# Patient Record
Sex: FEMALE | Race: WHITE | Employment: UNEMPLOYED | URBAN - METROPOLITAN AREA
[De-identification: names, ages, dates, MRNs, and addresses within clinical notes are randomized per-mention and may not be internally consistent; named-entity substitution may affect disease eponyms.]

---

## 2024-05-02 ENCOUNTER — OFFICE VISIT (OUTPATIENT)
Dept: INTERNAL MEDICINE CLINIC | Facility: CLINIC | Age: 64
End: 2024-05-02
Payer: MEDICARE

## 2024-05-02 VITALS
SYSTOLIC BLOOD PRESSURE: 130 MMHG | BODY MASS INDEX: 34.91 KG/M2 | HEART RATE: 60 BPM | WEIGHT: 217.19 LBS | DIASTOLIC BLOOD PRESSURE: 74 MMHG | HEIGHT: 66 IN | OXYGEN SATURATION: 100 % | TEMPERATURE: 98 F

## 2024-05-02 DIAGNOSIS — Z12.11 SCREENING FOR COLON CANCER: ICD-10-CM

## 2024-05-02 DIAGNOSIS — I10 PRIMARY HYPERTENSION: Primary | ICD-10-CM

## 2024-05-02 DIAGNOSIS — Z00.00 ANNUAL PHYSICAL EXAM: ICD-10-CM

## 2024-05-02 DIAGNOSIS — Z78.0 MENOPAUSE: ICD-10-CM

## 2024-05-02 DIAGNOSIS — M54.40 ACUTE RIGHT-SIDED LOW BACK PAIN WITH SCIATICA, SCIATICA LATERALITY UNSPECIFIED: ICD-10-CM

## 2024-05-02 DIAGNOSIS — I89.0 LYMPHEDEMA: ICD-10-CM

## 2024-05-02 PROBLEM — G47.33 OSA (OBSTRUCTIVE SLEEP APNEA): Status: ACTIVE | Noted: 2024-05-02

## 2024-05-02 PROBLEM — K21.9 GASTROESOPHAGEAL REFLUX DISEASE WITHOUT ESOPHAGITIS: Status: ACTIVE | Noted: 2024-05-02

## 2024-05-02 PROBLEM — E66.01 SEVERE OBESITY (BMI 35.0-39.9) WITH COMORBIDITY (HCC): Chronic | Status: ACTIVE | Noted: 2024-05-02

## 2024-05-02 PROCEDURE — 3078F DIAST BP <80 MM HG: CPT | Performed by: INTERNAL MEDICINE

## 2024-05-02 PROCEDURE — 1126F AMNT PAIN NOTED NONE PRSNT: CPT | Performed by: INTERNAL MEDICINE

## 2024-05-02 PROCEDURE — 3075F SYST BP GE 130 - 139MM HG: CPT | Performed by: INTERNAL MEDICINE

## 2024-05-02 PROCEDURE — 1160F RVW MEDS BY RX/DR IN RCRD: CPT | Performed by: INTERNAL MEDICINE

## 2024-05-02 PROCEDURE — 1159F MED LIST DOCD IN RCRD: CPT | Performed by: INTERNAL MEDICINE

## 2024-05-02 PROCEDURE — 3008F BODY MASS INDEX DOCD: CPT | Performed by: INTERNAL MEDICINE

## 2024-05-02 PROCEDURE — 99204 OFFICE O/P NEW MOD 45 MIN: CPT | Performed by: INTERNAL MEDICINE

## 2024-05-02 RX ORDER — ATORVASTATIN CALCIUM 10 MG/1
10 TABLET, FILM COATED ORAL DAILY
COMMUNITY
Start: 2022-01-01 | End: 2024-05-02

## 2024-05-02 RX ORDER — VALSARTAN 80 MG/1
80 TABLET ORAL DAILY
COMMUNITY
End: 2024-05-02

## 2024-05-02 RX ORDER — DICLOFENAC SODIUM 75 MG/1
75 TABLET, DELAYED RELEASE ORAL 2 TIMES DAILY
Qty: 180 TABLET | Refills: 0 | Status: SHIPPED | OUTPATIENT
Start: 2024-05-02

## 2024-05-02 RX ORDER — ATORVASTATIN CALCIUM 10 MG/1
10 TABLET, FILM COATED ORAL DAILY
Qty: 90 TABLET | Refills: 0 | Status: SHIPPED | OUTPATIENT
Start: 2024-05-02

## 2024-05-02 RX ORDER — RABEPRAZOLE SODIUM 20 MG/1
20 TABLET, DELAYED RELEASE ORAL DAILY
Qty: 90 TABLET | Refills: 0 | Status: SHIPPED | OUTPATIENT
Start: 2024-05-02

## 2024-05-02 RX ORDER — LISINOPRIL 40 MG/1
40 TABLET ORAL DAILY
Qty: 90 TABLET | Refills: 0 | Status: SHIPPED | OUTPATIENT
Start: 2024-05-02

## 2024-05-02 RX ORDER — DICLOFENAC SODIUM 75 MG/1
75 TABLET, DELAYED RELEASE ORAL 2 TIMES DAILY
COMMUNITY
Start: 2014-01-01 | End: 2024-05-02

## 2024-05-02 RX ORDER — LISINOPRIL 20 MG/1
1 TABLET ORAL DAILY
COMMUNITY
End: 2024-05-02

## 2024-05-02 RX ORDER — RABEPRAZOLE SODIUM 20 MG/1
20 TABLET, DELAYED RELEASE ORAL DAILY
COMMUNITY
Start: 2020-01-01 | End: 2024-05-02

## 2024-05-02 RX ORDER — VALSARTAN 40 MG/1
40 TABLET ORAL
COMMUNITY
Start: 2018-02-26 | End: 2024-05-02

## 2024-05-02 RX ORDER — PANTOPRAZOLE SODIUM 40 MG/1
40 TABLET, DELAYED RELEASE ORAL
COMMUNITY
Start: 2018-01-29 | End: 2024-05-02

## 2024-05-02 NOTE — PROGRESS NOTES
Subjective:     Patient ID: Daxa Leyva is a 64 year old female.    HPI    History/Other:   She came in today to establish care with new physician    She has history of chronic lymphedema, high blood pressure, hypercholesterolemia and GERD.  She denies any complaints.  She needs refill on her medication.  She was supposed to do colonoscopy last year she did Cologuard instead.  She is due for mammogram.  She is due for blood work.    Review of Systems   Constitutional: Negative.    HENT: Negative.     Eyes: Negative.    Respiratory: Negative.     Cardiovascular: Negative.    Gastrointestinal: Negative.    Endocrine: Negative.    Genitourinary: Negative.    Musculoskeletal: Negative.    Neurological: Negative.    Hematological: Negative.    Psychiatric/Behavioral: Negative.       Current Outpatient Medications   Medication Sig Dispense Refill    RABEprazole Sodium 20 MG Oral Tab EC Take 1 tablet (20 mg total) by mouth daily.      diclofenac 75 MG Oral Tab EC Take 1 tablet (75 mg total) by mouth 2 (two) times daily.      atorvastatin 10 MG Oral Tab Take 1 tablet (10 mg total) by mouth daily.      lisinopril 40 MG Oral Tab Take 1 tablet (40 mg total) by mouth daily. 90 tablet 0     Allergies:Not on File    Past Medical History:    Essential hypertension      Past Surgical History:   Procedure Laterality Date    Total knee replacement Bilateral       Family History   Problem Relation Age of Onset    Cancer Father     COPD Mother       Social History:   Social History     Socioeconomic History    Marital status:    Tobacco Use    Smoking status: Never    Smokeless tobacco: Never   Vaping Use    Vaping status: Never Used   Substance and Sexual Activity    Drug use: Never        Objective:   Physical Exam  Vitals and nursing note reviewed.   Constitutional:       Appearance: Normal appearance.   HENT:      Head: Normocephalic and atraumatic.   Cardiovascular:      Rate and Rhythm: Normal rate and regular rhythm.       Pulses: Normal pulses.      Heart sounds: Normal heart sounds.   Pulmonary:      Effort: Pulmonary effort is normal.      Breath sounds: Normal breath sounds.   Abdominal:      Palpations: Abdomen is soft.   Musculoskeletal:         General: Normal range of motion.      Cervical back: Normal range of motion and neck supple.   Skin:     General: Skin is warm.   Neurological:      Mental Status: She is alert. Mental status is at baseline.   Psychiatric:         Mood and Affect: Mood normal.         Assessment & Plan:   1. Screening for colon cancer gi       2. Menopause - bone scan   4. Acute right-sided low back pain with sciatica, sciatica laterality unspecified - Careful with back. Correct lifting with legs and not with back. Turn body completely to lift something from side. Back exercises. Correct posture when sitting with feet flat on floor and back against chair and computer at eye level.xr lumbar spine   5. Lymphedema chronic- referral for lymphedema therapy       Orders Placed This Encounter   Procedures    CBC W Differential W Platelet [E]    Comp Metabolic Panel (14)    TSH W Reflex To Free T4 [E]    Lipid Panel [E]    Hemoglobin A1C [E]       Meds This Visit:  Requested Prescriptions     Signed Prescriptions Disp Refills    lisinopril 40 MG Oral Tab 90 tablet 0     Sig: Take 1 tablet (40 mg total) by mouth daily.       Imaging & Referrals:  OP REFERRAL TO OT/PT LYMPHEDEMA CLINIC  OP REFERRAL TO [facility] GI TELEPHONE COLON SCREEN  XR DEXA BONE DENSITOMETRY (CPT=77080)  XR LUMBAR SPINE (MIN 2 VIEWS) (CPT=72100)

## 2024-05-09 ENCOUNTER — HOSPITAL ENCOUNTER (OUTPATIENT)
Dept: GENERAL RADIOLOGY | Age: 64
Discharge: HOME | End: 2024-05-09
Attending: INTERNAL MEDICINE

## 2024-05-09 ENCOUNTER — LAB ENCOUNTER (OUTPATIENT)
Dept: LAB | Facility: REFERENCE LAB | Age: 64
End: 2024-05-09
Attending: INTERNAL MEDICINE

## 2024-05-09 DIAGNOSIS — D50.8 OTHER IRON DEFICIENCY ANEMIA: ICD-10-CM

## 2024-05-09 DIAGNOSIS — M54.40 ACUTE RIGHT-SIDED LOW BACK PAIN WITH SCIATICA, SCIATICA LATERALITY UNSPECIFIED: ICD-10-CM

## 2024-05-09 DIAGNOSIS — Z00.00 ANNUAL PHYSICAL EXAM: ICD-10-CM

## 2024-05-09 LAB
ALBUMIN SERPL-MCNC: 4.4 G/DL (ref 3.2–4.8)
ALBUMIN/GLOB SERPL: 1.6 {RATIO} (ref 1–2)
ALP LIVER SERPL-CCNC: 105 U/L
ALT SERPL-CCNC: 26 U/L
ANION GAP SERPL CALC-SCNC: 4 MMOL/L (ref 0–18)
AST SERPL-CCNC: 26 U/L (ref ?–34)
BASOPHILS # BLD AUTO: 0.04 X10(3) UL (ref 0–0.2)
BASOPHILS NFR BLD AUTO: 0.7 %
BILIRUB SERPL-MCNC: 0.5 MG/DL (ref 0.2–1.1)
BUN BLD-MCNC: 12 MG/DL (ref 9–23)
BUN/CREAT SERPL: 15.8 (ref 10–20)
CALCIUM BLD-MCNC: 9.2 MG/DL (ref 8.7–10.4)
CHLORIDE SERPL-SCNC: 109 MMOL/L (ref 98–112)
CHOLEST SERPL-MCNC: 161 MG/DL (ref ?–200)
CO2 SERPL-SCNC: 29 MMOL/L (ref 21–32)
CREAT BLD-MCNC: 0.76 MG/DL
DEPRECATED HBV CORE AB SER IA-ACNC: 16.6 NG/ML
DEPRECATED RDW RBC AUTO: 42.5 FL (ref 35.1–46.3)
EGFRCR SERPLBLD CKD-EPI 2021: 86 ML/MIN/1.73M2 (ref 60–?)
EOSINOPHIL # BLD AUTO: 0.14 X10(3) UL (ref 0–0.7)
EOSINOPHIL NFR BLD AUTO: 2.6 %
ERYTHROCYTE [DISTWIDTH] IN BLOOD BY AUTOMATED COUNT: 13.1 % (ref 11–15)
EST. AVERAGE GLUCOSE BLD GHB EST-MCNC: 111 MG/DL (ref 68–126)
FASTING PATIENT LIPID ANSWER: YES
FASTING STATUS PATIENT QL REPORTED: YES
GLOBULIN PLAS-MCNC: 2.7 G/DL (ref 2–3.5)
GLUCOSE BLD-MCNC: 87 MG/DL (ref 70–99)
HBA1C MFR BLD: 5.5 % (ref ?–5.7)
HCT VFR BLD AUTO: 34.7 %
HDLC SERPL-MCNC: 71 MG/DL (ref 40–59)
HGB BLD-MCNC: 11.5 G/DL
IMM GRANULOCYTES # BLD AUTO: 0.01 X10(3) UL (ref 0–1)
IMM GRANULOCYTES NFR BLD: 0.2 %
IRON SATN MFR SERPL: 12 %
IRON SERPL-MCNC: 60 UG/DL
LDLC SERPL CALC-MCNC: 80 MG/DL (ref ?–100)
LYMPHOCYTES # BLD AUTO: 1.56 X10(3) UL (ref 1–4)
LYMPHOCYTES NFR BLD AUTO: 28.9 %
MCH RBC QN AUTO: 29.6 PG (ref 26–34)
MCHC RBC AUTO-ENTMCNC: 33.1 G/DL (ref 31–37)
MCV RBC AUTO: 89.2 FL
MONOCYTES # BLD AUTO: 0.49 X10(3) UL (ref 0.1–1)
MONOCYTES NFR BLD AUTO: 9.1 %
NEUTROPHILS # BLD AUTO: 3.15 X10 (3) UL (ref 1.5–7.7)
NEUTROPHILS # BLD AUTO: 3.15 X10(3) UL (ref 1.5–7.7)
NEUTROPHILS NFR BLD AUTO: 58.5 %
NONHDLC SERPL-MCNC: 90 MG/DL (ref ?–130)
OSMOLALITY SERPL CALC.SUM OF ELEC: 293 MOSM/KG (ref 275–295)
PLATELET # BLD AUTO: 321 10(3)UL (ref 150–450)
POTASSIUM SERPL-SCNC: 4.1 MMOL/L (ref 3.5–5.1)
PROT SERPL-MCNC: 7.1 G/DL (ref 5.7–8.2)
RBC # BLD AUTO: 3.89 X10(6)UL
SODIUM SERPL-SCNC: 142 MMOL/L (ref 136–145)
TIBC SERPL-MCNC: 508 UG/DL (ref 250–425)
TRANSFERRIN SERPL-MCNC: 341 MG/DL (ref 250–380)
TRIGL SERPL-MCNC: 47 MG/DL (ref 30–149)
TSI SER-ACNC: 1.35 MIU/ML (ref 0.55–4.78)
VLDLC SERPL CALC-MCNC: 7 MG/DL (ref 0–30)
WBC # BLD AUTO: 5.4 X10(3) UL (ref 4–11)

## 2024-05-09 PROCEDURE — 83540 ASSAY OF IRON: CPT

## 2024-05-09 PROCEDURE — 80061 LIPID PANEL: CPT

## 2024-05-09 PROCEDURE — 84466 ASSAY OF TRANSFERRIN: CPT

## 2024-05-09 PROCEDURE — 82728 ASSAY OF FERRITIN: CPT

## 2024-05-09 PROCEDURE — 83036 HEMOGLOBIN GLYCOSYLATED A1C: CPT

## 2024-05-09 PROCEDURE — 84443 ASSAY THYROID STIM HORMONE: CPT

## 2024-05-09 PROCEDURE — 36415 COLL VENOUS BLD VENIPUNCTURE: CPT

## 2024-05-09 PROCEDURE — 72100 X-RAY EXAM L-S SPINE 2/3 VWS: CPT | Performed by: INTERNAL MEDICINE

## 2024-05-09 PROCEDURE — 85025 COMPLETE CBC W/AUTO DIFF WBC: CPT

## 2024-05-09 PROCEDURE — 80053 COMPREHEN METABOLIC PANEL: CPT

## 2024-05-21 ENCOUNTER — HOSPITAL ENCOUNTER (OUTPATIENT)
Dept: BONE DENSITY | Age: 64
Discharge: HOME | End: 2024-05-21
Attending: INTERNAL MEDICINE

## 2024-05-21 DIAGNOSIS — Z78.0 MENOPAUSE: ICD-10-CM

## 2024-05-21 PROCEDURE — 77080 DXA BONE DENSITY AXIAL: CPT | Performed by: INTERNAL MEDICINE

## 2024-07-18 ENCOUNTER — OFFICE VISIT (OUTPATIENT)
Dept: INTEGRATIVE MEDICINE | Facility: CLINIC | Age: 64
End: 2024-07-18
Payer: MEDICARE

## 2024-07-18 VITALS
BODY MASS INDEX: 33.91 KG/M2 | WEIGHT: 211 LBS | OXYGEN SATURATION: 95 % | HEART RATE: 73 BPM | HEIGHT: 66 IN | SYSTOLIC BLOOD PRESSURE: 120 MMHG | DIASTOLIC BLOOD PRESSURE: 82 MMHG

## 2024-07-18 DIAGNOSIS — M51.36 DEGENERATIVE DISC DISEASE, LUMBAR: ICD-10-CM

## 2024-07-18 DIAGNOSIS — R60.0 LIPEDEMA OF LOWER EXTREMITY: ICD-10-CM

## 2024-07-18 DIAGNOSIS — M54.50 LUMBAR PAIN: Primary | ICD-10-CM

## 2024-07-18 DIAGNOSIS — R63.5 WEIGHT GAIN: ICD-10-CM

## 2024-07-18 DIAGNOSIS — R25.2 LEG CRAMPS: ICD-10-CM

## 2024-07-18 DIAGNOSIS — M43.17 SPONDYLOLISTHESIS AT L5-S1 LEVEL: ICD-10-CM

## 2024-07-18 PROCEDURE — 1159F MED LIST DOCD IN RCRD: CPT | Performed by: PHYSICIAN ASSISTANT

## 2024-07-18 PROCEDURE — 3008F BODY MASS INDEX DOCD: CPT | Performed by: PHYSICIAN ASSISTANT

## 2024-07-18 PROCEDURE — 3074F SYST BP LT 130 MM HG: CPT | Performed by: PHYSICIAN ASSISTANT

## 2024-07-18 PROCEDURE — G2211 COMPLEX E/M VISIT ADD ON: HCPCS | Performed by: PHYSICIAN ASSISTANT

## 2024-07-18 PROCEDURE — 99205 OFFICE O/P NEW HI 60 MIN: CPT | Performed by: PHYSICIAN ASSISTANT

## 2024-07-18 PROCEDURE — 3079F DIAST BP 80-89 MM HG: CPT | Performed by: PHYSICIAN ASSISTANT

## 2024-07-18 RX ORDER — AZELASTINE 1 MG/ML
2 SPRAY, METERED NASAL
COMMUNITY
Start: 2018-05-08

## 2024-07-18 NOTE — PATIENT INSTRUCTIONS
Vibration plate   Start in a seated position use it for a minute and slowly increase to 10 minutes as tolerated.   I have concerns about lipedema     The following website can be utilized to purchase supplements.     [email or web address]     Three meal avoid snacking   Fast no more than 12 hours   When you are eating start with fiber, then eat protein, finish with the carbs     Supplement recommendations:  Ortho Spore Complete (60 capsules) (Or[employer]o [employer] Products): 2 daily - can be taken with or without a meal any time of day.   Digestive Enzymes Ultra (90 capsules) (Pure Encapsulations): Please take  1 capsule, once / day (with meals. do not use with snacks and small meals ).   Biotin Complex Hair & Skin (60 capsules) (Pure Encapsulations): Please take  1 capsule, once / day.   GastroMend-HP (60 capsules) ([employer]): Please take  2 capsules, twice / day (on an empty stomach.)

## 2024-07-18 NOTE — PROGRESS NOTES
Daxa Leyva is a 64 year old female.  Chief Complaint   Patient presents with    Establish Care     Patient presents to [facility] care. Overall health.        HPI:   Daxa presents for initial evaluation,     Main concern:   She is looking a different way of managing her health.     Thyroid:   She has not had a concern      Body/rash:   She has had back pain. She has been struggling for 6 months. Shesat a lot throughout her life. She has tried different chairs. She tries to doing stretches and sound healing. Started with low back ache. She has had pain and numbness above. She has pain that has settled in both of the heels. The numbness comes and goes.     She has the sensation is still present but the heel does not feel correct  She can ambulate no issues     She has horrible leg cramps. They are thigh and lower leg lasting 3 minutes - 5 minutes. This will last a year.     She has leg swelling. She has done compression socks. She has not had improvement     Hormones - She did not have hot flashes.She had heavy painful regular periods   NO infertility issues, no miscarriage  Some brain fog   She would like to improve brain health.     GI -   Regular. It has been proportionate to what she is eating and drinking. No bloating  She has gerd. She has been on PPI for a few years. No endoscopy     Mental state -   She feels good emotionally. She has never had huge fluctuations     Weight History:   She was never overweight as a kid. She was a little heavier as a child. When she went through menopause it was a small weight gain. She has a harder time getting rid of it.      She did weight watchers. She struggles not buying food just for her.    She always had larger legs. She started having accumulation around her legs. Late 30s early 40s     She has lost 5-10 pounds she has noticed decreased weight in feet.       Pertinent Family history:   She has a grandmother who was heavy and had large legs.        Lifestyle Factors  affecting health:   Diet -   Breakfast - eggs, Sometimes she will do frozen foods but she tries not to. Some cheese   Lunch sometimes she skips sometimes she doesn't  Sometimes leftovers, sometimes salad, when she goes out she will do vegetable soup. She tries to be sensible knowing dinner might not be  Dinner meat and vegetable sometimes a side, rice or a noodle something small     Snack skinny pop is here favorite, sometimes honey wheat pretzels       Exercise - she does not do well with exercise. She sits a lot during the day. She tries to get out during the day.   She will try to walk 1.5 miles on the days she can      Stress - Stress has gotten a lot better. She had a boss that was toxic he is not longer in the work life     Sleep -   Sleep is affected because of back pain.     Supplements:   [employer] brand brain health    Viactive    Centrum   Rolo brain health   ALLERGIES     Allergies   Allergen Reactions    Dermabond ANAPHYLAXIS     Dermabond    Tetanus Toxoids OTHER (SEE COMMENTS) and UNKNOWN     -    Pt states that she does not recall.        CURRENT MEDICATIONS:     Current Outpatient Medications   Medication Sig Dispense Refill    azelastine 0.1 % Nasal Solution 2 sprays by Nasal route.      Multiple Vitamins-Minerals (CENTRUM ADULT 50+ MULTIGUMMIES OR) Take by mouth.      lisinopril 40 MG Oral Tab Take 1 tablet (40 mg total) by mouth daily. 90 tablet 0    atorvastatin 10 MG Oral Tab Take 1 tablet (10 mg total) by mouth daily. 90 tablet 0    diclofenac 75 MG Oral Tab EC Take 1 tablet (75 mg total) by mouth 2 (two) times daily. 180 tablet 0    RABEprazole Sodium 20 MG Oral Tab EC Take 1 tablet (20 mg total) by mouth daily. 90 tablet 0       MEDICAL HISTORY:     Past Medical History:    Essential hypertension       SURGICAL HISTORY:     Past Surgical History:   Procedure Laterality Date    Total knee replacement Bilateral        FAMILY HISTORY:      Family History   Problem Relation Age of Onset    Cancer  Father     COPD Mother        SOCIAL HISTORY:     Social History     Socioeconomic History    Marital status:    Tobacco Use    Smoking status: Never    Smokeless tobacco: Never   Vaping Use    Vaping status: Never Used   Substance and Sexual Activity    Alcohol use: Never    Drug use: Never       REVIEW OF SYSTEMS:   Review of Systems     See HPI for pertinent positives and negatives     PHYSICAL EXAM:     Vitals:    07/18/24 1126   BP: 120/82   BP Location: Right arm   Patient Position: Sitting   Cuff Size: adult   Pulse: 73   SpO2: 95%   Weight: 211 lb (95.7 kg)   Height: 5' 6\" (1.676 m)       Physical Exam     Physical Exam  Constitutional:       Appearance: Normal appearance.   Neurological:      General: No focal deficit present.      Mental Status: She is alert and oriented to person, place, and time.   Psychiatric:         Mood and Affect: Mood normal.    ASSESSMENT AND PLAN:     Heel numbness - could be s1 nerve or tibial nerve entrapment due to lipedema and/or lymphadema   Start with vibration plate   Back pain - lumbar x ray shows spondylolisthesis of L5-S1 and diffuse narrowing physical therapy ordered and she will go to [facility] in [place].  Future we may want to consider  MRI.    Patient has history of acid reflux and is on a PPI.  We will start by supplements for her gut health in hopes to eliminate PPI.    Patient will continue to work on weight loss by eating healthy and trying to move.  Had discussion about Lipedema and how we may want to consider GLP-1 agonist for treatment due to how hard it can be to lose weight with this condition.      1. Lumbar pain  - Physical Therapy Referral - External    2. Spondylolisthesis at L5-S1 level  - Physical Therapy Referral - External    3. Degenerative disc disease, lumbar  - Physical Therapy Referral - External    4. Leg cramps  - Magnesium [E]; Future    5. Weight gain  - Free T3 (Triiodothryronine); Future  - Reverse T3, Serum; Future  - Free T4,  (Free Thyroxine); Future  - Assay, Thyroid Stim Hormone; Future  - Thyroid Peroxidase (TPO) AB; Future  - Thyroid Antithyroglobulin AB; Future  - Insulin; Future  - Leptin, Serum; Future    6. Lipedema of lower extremity  - Free T3 (Triiodothryronine); Future  - Reverse T3, Serum; Future  - Free T4, (Free Thyroxine); Future  - Assay, Thyroid Stim Hormone; Future  - Thyroid Peroxidase (TPO) AB; Future  - Thyroid Antithyroglobulin AB; Future  - Insulin; Future  - Leptin, Serum; Future      Time spent with patient: Over 60 minutes spent in chart review and in direct communication with patient obtaining and reviewing history, creating a unique care plan, explaining the rationale for treatment, reviewing potential SE and overall treatment plan,  documenting all clinical information in Epic. Over 50% of this time was in education, counseling and coordination of care.     Problem List Items Addressed This Visit    None  Visit Diagnoses       Lumbar pain    -  Primary    Relevant Orders    Physical Therapy Referral - External    Spondylolisthesis at L5-S1 level        Relevant Orders    Physical Therapy Referral - External    Degenerative disc disease, lumbar        Relevant Orders    Physical Therapy Referral - External    Leg cramps        Relevant Orders    Magnesium [E]    Weight gain        Relevant Orders    Free T3 (Triiodothryronine)    Reverse T3, Serum    Free T4, (Free Thyroxine)    Assay, Thyroid Stim Hormone    Thyroid Peroxidase (TPO) AB    Thyroid Antithyroglobulin AB    Insulin    Leptin, Serum    Lipedema of lower extremity        Relevant Orders    Free T3 (Triiodothryronine)    Reverse T3, Serum    Free T4, (Free Thyroxine)    Assay, Thyroid Stim Hormone    Thyroid Peroxidase (TPO) AB    Thyroid Antithyroglobulin AB    Insulin    Leptin, Serum             Orders Placed This Visit:  Orders Placed This Encounter   Procedures    Free T3 (Triiodothryronine)    Reverse T3, Serum    Free T4, (Free Thyroxine)     Assay, Thyroid Stim Hormone    Thyroid Peroxidase (TPO) AB    Thyroid Antithyroglobulin AB    Insulin    Leptin, Serum    Magnesium [E]     Orders Placed This Encounter   Procedures    Physical Therapy Referral - External       Patient Instructions   Vibration plate   Start in a seated position use it for a minute and slowly increase to 10 minutes as tolerated.   I have concerns about lipedema     The following website can be utilized to purchase supplements.     [email or web address]     Three meal avoid snacking   Fast no more than 12 hours   When you are eating start with fiber, then eat protein, finish with the carbs     Supplement recommendations:  Ortho Spore Complete (60 capsules) (Ortho Molecular Products): 2 daily - can be taken with or without a meal any time of day.   Digestive Enzymes Ultra (90 capsules) (Pure Encapsulations): Please take  1 capsule, once / day (with meals. do not use with snacks and small meals ).   Biotin Complex Hair & Skin (60 capsules) (Pure Encapsulations): Please take  1 capsule, once / day.   GastroMend-HP (60 capsules) ([employer]): Please take  2 capsules, twice / day (on an empty stomach.)    Return for 6-8.    Patient affirmed understanding of plan and all questions were answered.     Julia Roberto PA-C

## 2024-07-19 ENCOUNTER — LAB ENCOUNTER (OUTPATIENT)
Dept: LAB | Facility: REFERENCE LAB | Age: 64
End: 2024-07-19
Attending: PHYSICIAN ASSISTANT
Payer: MEDICARE

## 2024-07-19 DIAGNOSIS — R25.2 LEG CRAMPS: ICD-10-CM

## 2024-07-19 DIAGNOSIS — R63.5 WEIGHT GAIN: ICD-10-CM

## 2024-07-19 DIAGNOSIS — R60.0 LIPEDEMA OF LOWER EXTREMITY: ICD-10-CM

## 2024-07-19 LAB
INSULIN SERPL-ACNC: 11.9 MU/L (ref 3–25)
MAGNESIUM SERPL-MCNC: 2 MG/DL (ref 1.6–2.6)
T3FREE SERPL-MCNC: 2.27 PG/ML (ref 2.4–4.2)
T4 FREE SERPL-MCNC: 1.4 NG/DL (ref 0.8–1.7)
THYROPEROXIDASE AB SERPL-ACNC: 33 U/ML (ref ?–60)
TSI SER-ACNC: 1.58 MIU/ML (ref 0.55–4.78)

## 2024-07-19 PROCEDURE — 83520 IMMUNOASSAY QUANT NOS NONAB: CPT

## 2024-07-19 PROCEDURE — 84481 FREE ASSAY (FT-3): CPT

## 2024-07-19 PROCEDURE — 86800 THYROGLOBULIN ANTIBODY: CPT

## 2024-07-19 PROCEDURE — 86376 MICROSOMAL ANTIBODY EACH: CPT

## 2024-07-19 PROCEDURE — 84482 T3 REVERSE: CPT

## 2024-07-19 PROCEDURE — 36415 COLL VENOUS BLD VENIPUNCTURE: CPT

## 2024-07-19 PROCEDURE — 83525 ASSAY OF INSULIN: CPT

## 2024-07-19 PROCEDURE — 83735 ASSAY OF MAGNESIUM: CPT

## 2024-07-19 PROCEDURE — 84439 ASSAY OF FREE THYROXINE: CPT

## 2024-07-19 PROCEDURE — 84443 ASSAY THYROID STIM HORMONE: CPT

## 2024-07-20 LAB — THYROGLOB SERPL-MCNC: <15 U/ML (ref ?–60)

## 2024-07-23 LAB
LEPTIN: 47.9 NG/ML
REVERSE T3: 23.7 NG/DL

## 2024-07-23 RX ORDER — DICLOFENAC SODIUM 75 MG/1
75 TABLET, DELAYED RELEASE ORAL 2 TIMES DAILY
Qty: 180 TABLET | Refills: 0 | Status: SHIPPED | OUTPATIENT
Start: 2024-07-23

## 2024-07-23 RX ORDER — ATORVASTATIN CALCIUM 10 MG/1
10 TABLET, FILM COATED ORAL DAILY
Qty: 90 TABLET | Refills: 3 | Status: SHIPPED | OUTPATIENT
Start: 2024-07-23

## 2024-07-23 RX ORDER — LISINOPRIL 40 MG/1
40 TABLET ORAL DAILY
Qty: 90 TABLET | Refills: 3 | Status: SHIPPED | OUTPATIENT
Start: 2024-07-23

## 2024-07-23 RX ORDER — RABEPRAZOLE SODIUM 20 MG/1
20 TABLET, DELAYED RELEASE ORAL DAILY
Qty: 90 TABLET | Refills: 3 | Status: SHIPPED | OUTPATIENT
Start: 2024-07-23

## 2024-07-23 NOTE — TELEPHONE ENCOUNTER
Refill passed per [facility] protocol.  Requested Prescriptions   Pending Prescriptions Disp Refills    lisinopril 40 MG Oral Tab 90 tablet 0     Sig: Take 1 tablet (40 mg total) by mouth daily.       Hypertension Medications Protocol Passed - 7/19/2024  7:27 AM        Passed - CMP or BMP in past 12 months        Passed - Last BP reading less than 140/90     BP Readings from Last 1 Encounters:   07/18/24 120/82               Passed - In person appointment or virtual visit in the past 12 mos or appointment in next 3 mos     Recent Outpatient Visits              5 days ago Lumbar pain    [facility], Nia James Emily E, PA-C    Office Visit    2 months ago Primary hypertension    [facility], Nia James Arlinda, MD    Office Visit          Future Appointments         Provider Department Appt Notes    In 1 month Julia Roberto PA-C [facility], [place], [place] 2 month f/u                    Passed - EGFRCR or GFRNAA > 50     GFR Evaluation  EGFRCR: 86 , resulted on 5/9/2024            atorvastatin 10 MG Oral Tab 90 tablet 0     Sig: Take 1 tablet (10 mg total) by mouth daily.       Cholesterol Medication Protocol Passed - 7/19/2024  7:27 AM        Passed - ALT < 80     Lab Results   Component Value Date    ALT 26 05/09/2024             Passed - ALT resulted within past year        Passed - Lipid panel within past 12 months     Lab Results   Component Value Date    CHOLEST 161 05/09/2024    TRIG 47 05/09/2024    HDL 71 (H) 05/09/2024    LDL 80 05/09/2024    VLDL 7 05/09/2024    NONHDLC 90 05/09/2024             Passed - In person appointment or virtual visit in the past 12 mos or appointment in next 3 mos     Recent Outpatient Visits              5 days ago Lumbar pain    [facility], Nia James Emily E, PA-C    Office Visit    2 months ago Primary hypertension     [facility], Nia James Arlinda, MD    Office Visit          Future Appointments         Provider Department Appt Notes    In 1 month Julia Roberto PA-C [facility], [place] [address] [place], [place] 2 month f/u                      diclofenac 75 MG Oral Tab  tablet 0     Sig: Take 1 tablet (75 mg total) by mouth 2 (two) times daily.       Non-Narcotic Pain Medication Protocol Passed - 7/19/2024  7:27 AM        Passed - In person appointment or virtual visit in the past 6 mos or appointment in next 3 mos     Recent Outpatient Visits              5 days ago Lumbar pain    [facility], Nia James Emily E, PA-C    Office Visit    2 months ago Primary hypertension    [facility], Nia James Arlinda, MD    Office Visit          Future Appointments         Provider Department Appt Notes    In 1 month Julia Roberto PA-C [facility], [address] [place], [place] 2 month f/u                      RABEprazole Sodium 20 MG Oral Tab EC 90 tablet 0     Sig: Take 1 tablet (20 mg total) by mouth daily.       Gastrointestional Medication Protocol Passed - 7/19/2024  7:27 AM        Passed - In person appointment or virtual visit in the past 12 mos or appointment in next 3 mos     Recent Outpatient Visits              5 days ago Lumbar pain    [facility], Nia James Emily E, PA-C    Office Visit    2 months ago [facility]    [facility]          Future Appointments         Provider Department Appt Notes    In 1 month Julia Roberto PA-C [facility], [address] [place], [place] 2 month f/u                       Recent Outpatient Visits              5 days ago Lumbar pain    [facility], [address] [place], [place]  Julia Roberto PA-C    Office Visit    2 months ago Primary hypertension    [facility], Nia James Arlinda, MD    Office Visit          Future Appointments         Provider Department Appt Notes    In 1 month Julia Roberto PA-C [facility], [place], [place] 2 month f/u

## 2024-07-25 ENCOUNTER — APPOINTMENT (OUTPATIENT)
Dept: CT IMAGING | Age: 64
End: 2024-07-25
Attending: STUDENT IN AN ORGANIZED HEALTH CARE EDUCATION/TRAINING PROGRAM

## 2024-07-25 ENCOUNTER — WALK IN (OUTPATIENT)
Dept: URGENT CARE | Age: 64
End: 2024-07-25

## 2024-07-25 ENCOUNTER — APPOINTMENT (OUTPATIENT)
Dept: GENERAL RADIOLOGY | Age: 64
End: 2024-07-25
Attending: STUDENT IN AN ORGANIZED HEALTH CARE EDUCATION/TRAINING PROGRAM

## 2024-07-25 ENCOUNTER — HOSPITAL ENCOUNTER (EMERGENCY)
Age: 64
Discharge: HOME | End: 2024-07-25
Attending: STUDENT IN AN ORGANIZED HEALTH CARE EDUCATION/TRAINING PROGRAM

## 2024-07-25 VITALS
DIASTOLIC BLOOD PRESSURE: 57 MMHG | TEMPERATURE: 99 F | OXYGEN SATURATION: 95 % | RESPIRATION RATE: 16 BRPM | WEIGHT: 214 LBS | SYSTOLIC BLOOD PRESSURE: 123 MMHG | BODY MASS INDEX: 34.39 KG/M2 | HEIGHT: 66 IN | HEART RATE: 63 BPM

## 2024-07-25 VITALS
HEART RATE: 66 BPM | SYSTOLIC BLOOD PRESSURE: 134 MMHG | HEIGHT: 66 IN | DIASTOLIC BLOOD PRESSURE: 82 MMHG | WEIGHT: 214.6 LBS | OXYGEN SATURATION: 98 % | BODY MASS INDEX: 34.49 KG/M2 | RESPIRATION RATE: 16 BRPM | TEMPERATURE: 99.1 F
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
## 2024-07-25 DIAGNOSIS — S02.2XXA CLOSED FRACTURE OF NASAL BONE, INITIAL ENCOUNTER: Primary | ICD-10-CM

## 2024-07-25 DIAGNOSIS — M95.0 NASAL DEFORMITY: ICD-10-CM

## 2024-07-25 DIAGNOSIS — S09.90XA CLOSED HEAD INJURY WITHOUT LOSS OF CONSCIOUSNESS, INITIAL ENCOUNTER: Primary | ICD-10-CM

## 2024-07-25 PROCEDURE — 70486 CT MAXILLOFACIAL W/O DYE: CPT

## 2024-07-25 PROCEDURE — 99284 EMERGENCY DEPT VISIT MOD MDM: CPT

## 2024-07-25 PROCEDURE — 99202 OFFICE O/P NEW SF 15 MIN: CPT | Performed by: PHYSICIAN ASSISTANT

## 2024-07-25 PROCEDURE — 70450 CT HEAD/BRAIN W/O DYE: CPT | Performed by: RADIOLOGY

## 2024-07-25 PROCEDURE — 10002803 HB RX 637: Performed by: STUDENT IN AN ORGANIZED HEALTH CARE EDUCATION/TRAINING PROGRAM

## 2024-07-25 PROCEDURE — 10004651 HB RX, NO CHARGE ITEM: Performed by: STUDENT IN AN ORGANIZED HEALTH CARE EDUCATION/TRAINING PROGRAM

## 2024-07-25 PROCEDURE — 99284 EMERGENCY DEPT VISIT MOD MDM: CPT | Performed by: STUDENT IN AN ORGANIZED HEALTH CARE EDUCATION/TRAINING PROGRAM

## 2024-07-25 PROCEDURE — 90715 TDAP VACCINE 7 YRS/> IM: CPT | Performed by: STUDENT IN AN ORGANIZED HEALTH CARE EDUCATION/TRAINING PROGRAM

## 2024-07-25 PROCEDURE — 73562 X-RAY EXAM OF KNEE 3: CPT | Performed by: RADIOLOGY

## 2024-07-25 PROCEDURE — 90471 IMMUNIZATION ADMIN: CPT | Performed by: STUDENT IN AN ORGANIZED HEALTH CARE EDUCATION/TRAINING PROGRAM

## 2024-07-25 PROCEDURE — 73562 X-RAY EXAM OF KNEE 3: CPT

## 2024-07-25 PROCEDURE — 70486 CT MAXILLOFACIAL W/O DYE: CPT | Performed by: RADIOLOGY

## 2024-07-25 PROCEDURE — 70450 CT HEAD/BRAIN W/O DYE: CPT

## 2024-07-25 PROCEDURE — 10002800 HB RX 250 W HCPCS: Performed by: STUDENT IN AN ORGANIZED HEALTH CARE EDUCATION/TRAINING PROGRAM

## 2024-07-25 RX ORDER — ATORVASTATIN CALCIUM 10 MG/1
10 TABLET, FILM COATED ORAL DAILY
COMMUNITY
Start: 2024-04-22

## 2024-07-25 RX ORDER — ACETAMINOPHEN 325 MG/1
650 TABLET ORAL ONCE
Status: COMPLETED | OUTPATIENT
Start: 2024-07-25 | End: 2024-07-25

## 2024-07-25 RX ORDER — LISINOPRIL 40 MG/1
40 TABLET ORAL DAILY
COMMUNITY
Start: 2024-04-22

## 2024-07-25 RX ORDER — RABEPRAZOLE SODIUM 20 MG/1
20 TABLET, DELAYED RELEASE ORAL DAILY PRN
COMMUNITY
Start: 2024-04-22

## 2024-07-25 RX ORDER — DICLOFENAC SODIUM 75 MG/1
75 TABLET, DELAYED RELEASE ORAL EVERY 12 HOURS PRN
COMMUNITY
Start: 2024-04-22

## 2024-07-25 RX ORDER — BACITRACIN ZINC 500 [USP'U]/G
OINTMENT TOPICAL ONCE
Status: COMPLETED | OUTPATIENT
Start: 2024-07-25 | End: 2024-07-25

## 2024-07-25 RX ORDER — HYDROCODONE BITARTRATE AND ACETAMINOPHEN 5; 325 MG/1; MG/1
1 TABLET ORAL EVERY 6 HOURS PRN
Qty: 6 TABLET | Refills: 0 | Status: SHIPPED | OUTPATIENT
Start: 2024-07-25

## 2024-07-25 RX ADMIN — TETANUS TOXOID, REDUCED DIPHTHERIA TOXOID AND ACELLULAR PERTUSSIS VACCINE, ADSORBED 0.5 ML: 5; 2.5; 8; 8; 2.5 SUSPENSION INTRAMUSCULAR at 20:39

## 2024-07-25 RX ADMIN — BACITRACIN ZINC: 500 OINTMENT TOPICAL at 20:38

## 2024-07-25 RX ADMIN — ACETAMINOPHEN 650 MG: 325 TABLET ORAL at 20:02

## 2024-07-25 SDOH — SOCIAL STABILITY: SOCIAL INSECURITY: HOW OFTEN DOES ANYONE, INCLUDING FAMILY AND FRIENDS, THREATEN YOU WITH HARM?: NEVER

## 2024-07-25 SDOH — SOCIAL STABILITY: SOCIAL INSECURITY: HOW OFTEN DOES ANYONE, INCLUDING FAMILY AND FRIENDS, PHYSICALLY HURT YOU?: NEVER

## 2024-07-25 SDOH — SOCIAL STABILITY: SOCIAL INSECURITY: HOW OFTEN DOES ANYONE, INCLUDING FAMILY AND FRIENDS, INSULT OR TALK DOWN TO YOU?: NEVER

## 2024-07-25 SDOH — SOCIAL STABILITY: SOCIAL INSECURITY: HOW OFTEN DOES ANYONE, INCLUDING FAMILY AND FRIENDS, SCREAM OR CURSE AT YOU?: NEVER

## 2024-07-25 ASSESSMENT — ENCOUNTER SYMPTOMS
SORE THROAT: 0
VOMITING: 0
CHILLS: 0
NAUSEA: 0
DIZZINESS: 0
ABDOMINAL PAIN: 0
SHORTNESS OF BREATH: 0
DIARRHEA: 0
FEVER: 0
LIGHT-HEADEDNESS: 0
HEADACHES: 0
WOUND: 1
COUGH: 0

## 2024-07-25 ASSESSMENT — PAIN SCALES - GENERAL
PAINLEVEL_OUTOF10: 4
PAINLEVEL_OUTOF10: 2
PAINLEVEL_OUTOF10: 2

## 2024-07-30 ENCOUNTER — OFFICE VISIT (OUTPATIENT)
Dept: OTOLARYNGOLOGY | Facility: CLINIC | Age: 64
End: 2024-07-30
Payer: MEDICARE

## 2024-07-30 DIAGNOSIS — S02.2XXA CLOSED FRACTURE OF NASAL BONE, INITIAL ENCOUNTER: Primary | ICD-10-CM

## 2024-07-30 PROCEDURE — 99203 OFFICE O/P NEW LOW 30 MIN: CPT | Performed by: STUDENT IN AN ORGANIZED HEALTH CARE EDUCATION/TRAINING PROGRAM

## 2024-07-30 PROCEDURE — 1159F MED LIST DOCD IN RCRD: CPT | Performed by: STUDENT IN AN ORGANIZED HEALTH CARE EDUCATION/TRAINING PROGRAM

## 2024-07-30 PROCEDURE — 1160F RVW MEDS BY RX/DR IN RCRD: CPT | Performed by: STUDENT IN AN ORGANIZED HEALTH CARE EDUCATION/TRAINING PROGRAM

## 2024-07-30 NOTE — PROGRESS NOTES
Daxa Leyva is a 64 year old female.   Chief Complaint   Patient presents with    Fracture     Had a fall on 7/25, was seen in ER with a fracture to the nose, denies any pain today, CT report in care everywhere     HPI:   64-year-old presents after falling on July 25 onto concrete.  Reported at an outside hospital in [place] demonstrated a minimally displaced right nasal bone fracture    Current Outpatient Medications   Medication Sig Dispense Refill    lisinopril 40 MG Oral Tab Take 1 tablet (40 mg total) by mouth daily. 90 tablet 3    atorvastatin 10 MG Oral Tab Take 1 tablet (10 mg total) by mouth daily. 90 tablet 3    diclofenac 75 MG Oral Tab EC Take 1 tablet (75 mg total) by mouth 2 (two) times daily. 180 tablet 0    RABEprazole Sodium 20 MG Oral Tab EC Take 1 tablet (20 mg total) by mouth daily. 90 tablet 3    Multiple Vitamins-Minerals (CENTRUM ADULT 50+ MULTIGUMMIES OR) Take by mouth.      azelastine 0.1 % Nasal Solution 2 sprays by Nasal route.        Past Medical History:    Essential hypertension      Social History:  Social History     Socioeconomic History    Marital status:    Tobacco Use    Smoking status: Never    Smokeless tobacco: Never   Vaping Use    Vaping status: Never Used   Substance and Sexual Activity    Alcohol use: Never    Drug use: Never      Past Surgical History:   Procedure Laterality Date    Total knee replacement Bilateral          EXAM:   There were no vitals taken for this visit.    System Details   Skin Inspection - Normal.   Constitutional Overall appearance - Normal.   Head/Face Symmetric, TMJ tenderness not present    Eyes EOMI, PERRL   Right ear:  Canal clear, TM intact, no TURNER   Left ear:  Canal clear, TM intact, no TURNER   Nose: Septum midline, inferior turbinates not enlarged, nasal valves without collapse   There is no septal hematoma.  There is a moderate amount of midface edema   Oral cavity/Oropharynx: No lesions or masses on inspection or palpation,  tonsils symmetric    Neck: Soft without LAD, thyroid not enlarged  Voice clear/ no stridor   Other:      SCOPES AND PROCEDURES:         AUDIOGRAM AND IMAGING:         IMPRESSION:   1. Closed fracture of nasal bone, initial encounter       Recommendations:  -Reviewed the report that demonstrates a minimally displaced right nasal bone fracture although I do not have the images today.  She does have a moderate amount of midface edema obscuring the nasal bones  -An extensive discussion regarding indications for reduction will likely currently observe the fracture given the minimal displacement and lack of obvious cosmetic concerns  -Discussed swelling precautions and if the swelling goes down and reveals a concerning deviation of her dorsum she can return next week    The patient indicates understanding of these issues and agrees to the plan.      Negro Crabtree MD  7/30/2024  8:34 AM

## 2024-09-03 ENCOUNTER — OFFICE VISIT (OUTPATIENT)
Dept: ORTHOPEDICS CLINIC | Facility: CLINIC | Age: 64
End: 2024-09-03
Payer: MEDICARE

## 2024-09-03 VITALS — WEIGHT: 210 LBS | BODY MASS INDEX: 33.75 KG/M2 | HEIGHT: 66 IN

## 2024-09-03 DIAGNOSIS — M17.12 PRIMARY OSTEOARTHRITIS OF LEFT KNEE: Primary | ICD-10-CM

## 2024-09-03 DIAGNOSIS — M76.892 PES ANSERINUS TENDINITIS OF LEFT LOWER EXTREMITY: ICD-10-CM

## 2024-09-03 DIAGNOSIS — Z96.652 STATUS POST TOTAL LEFT KNEE REPLACEMENT USING CEMENT: ICD-10-CM

## 2024-09-03 DIAGNOSIS — Z96.651 STATUS POST TOTAL RIGHT KNEE REPLACEMENT USING CEMENT: ICD-10-CM

## 2024-09-03 DIAGNOSIS — M17.11 PRIMARY OSTEOARTHRITIS OF RIGHT KNEE: ICD-10-CM

## 2024-09-03 DIAGNOSIS — M76.891 PES ANSERINUS TENDINITIS OF RIGHT LOWER EXTREMITY: ICD-10-CM

## 2024-09-03 PROCEDURE — 99204 OFFICE O/P NEW MOD 45 MIN: CPT | Performed by: ORTHOPAEDIC SURGERY

## 2024-09-03 PROCEDURE — 1159F MED LIST DOCD IN RCRD: CPT | Performed by: ORTHOPAEDIC SURGERY

## 2024-09-03 PROCEDURE — 1126F AMNT PAIN NOTED NONE PRSNT: CPT | Performed by: ORTHOPAEDIC SURGERY

## 2024-09-03 PROCEDURE — 3008F BODY MASS INDEX DOCD: CPT | Performed by: ORTHOPAEDIC SURGERY

## 2024-09-03 PROCEDURE — 1160F RVW MEDS BY RX/DR IN RCRD: CPT | Performed by: ORTHOPAEDIC SURGERY

## 2024-09-03 RX ORDER — CLOTRIMAZOLE AND BETAMETHASONE DIPROPIONATE 10; .64 MG/G; MG/G
CREAM TOPICAL
COMMUNITY
End: 2024-09-03 | Stop reason: ALTCHOICE

## 2024-09-03 RX ORDER — LANSOPRAZOLE 30 MG/1
CAPSULE, DELAYED RELEASE ORAL
COMMUNITY
End: 2024-09-03 | Stop reason: DRUGHIGH

## 2024-09-03 NOTE — H&P
NURSING INTAKE COMMENTS:   Chief Complaint   Patient presents with    Knee Pain     Bilateral - onset in July when she fell on both her knees - she has a disc with x-rays from after the fall  and a report - states she has mostly no pain - she had bilateral knees replaced in 2014 and 2018 - she has still a little swelling in the knee - still has pain with touch rated as 3/10        HPI: This 64 year old female presents today for opinions regarding her bilateral knee replacements and a fall recently.  She had the knees replaced by Dr. Andrews at [facility] October 2014 in February 2015.  They had been doing well until she fell 7/25/2024.  She had bruising and swelling bilaterally.  She self treated.  The left knee has some pain on the prepatellar bursa region and might have a little swelling residual.  She denies numbness or tingling or instability.  She feels the knees are functioning well.    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  She lives independently in a house with stairs with her family.  She drives.  She is not using cane, crutch, or walker.  She likes walking and reading for exercise.  Medical history is fairly minimal. BMI is 33.89.    Past Medical History:    Essential hypertension     Past Surgical History:   Procedure Laterality Date    Total knee replacement Bilateral      Current Outpatient Medications   Medication Sig Dispense Refill    lisinopril 40 MG Oral Tab Take 1 tablet (40 mg total) by mouth daily. 90 tablet 3    atorvastatin 10 MG Oral Tab Take 1 tablet (10 mg total) by mouth daily. 90 tablet 3    diclofenac 75 MG Oral Tab EC Take 1 tablet (75 mg total) by mouth 2 (two) times daily. 180 tablet 0    RABEprazole Sodium 20 MG Oral Tab EC Take 1 tablet (20 mg total) by mouth daily. 90 tablet 3    Multiple Vitamins-Minerals (CENTRUM ADULT 50+ MULTIGUMMIES OR) Take by mouth.       Allergies   Allergen Reactions    Dermabond ANAPHYLAXIS     Dermabond    Tetanus Toxoids  OTHER (SEE COMMENTS) and UNKNOWN     -    Pt states that she does not recall.     Family History   Problem Relation Age of Onset    Cancer Father     COPD Mother      No family Hx of DVT/PE    Social History     Occupational History    Not on file   Tobacco Use    Smoking status: Never    Smokeless tobacco: Never   Vaping Use    Vaping status: Never Used   Substance and Sexual Activity    Alcohol use: Never    Drug use: Never    Sexual activity: Not on file        Review of Systems:  GENERAL: feels generally well, no significant weight loss or weight gain  SKIN: no ulcerated or worrisome skin lesions  EYES:denies blurred vision or double vision  HEENT: denies new nasal congestion, sinus pain or ST  LUNGS: denies shortness of breath  CARDIOVASCULAR: denies chest pain  GI: no hematemesis, no worsening heartburn, no diarrhea  : no dysuria, no blood in urine, no difficulty urinating, no incontinence  MUSCULOSKELETAL: no other musculoskeletal complaints other than in HPI  NEURO: no numbness or tingling, no weakness or balance disorder  PSYCHE: no depression or anxiety  HEMATOLOGIC: no hx of blood dyscrasia, no Hx DVT/PE  ENDOCRINE: no thyroid or diabetes issues  ALL/ASTHMA: no new hx of severe allergy or asthma    Physical Examination:    Ht 5' 6\" (1.676 m)   Wt 210 lb (95.3 kg)   BMI 33.89 kg/m²   Constitutional: appears well hydrated, alert and responsive, no acute distress noted  Extremities: Incisions are healed cosmetically bilaterally.  The left prepatellar bursa might have a little inflammation but no fluid collection and no redness.  Calves soft and nontender.  Healthy skin on the legs.  Very minimal residual bruising mid shins bilaterally.  Musculoskeletal: Both knees had full extension.  The right flex to 105 degrees and the left to 110 degrees.  The patella joints were functioning well and there was no instability to the collateral ligaments or anterior posterior.  No abnormal sounds.  Tenderness on the  pes anserinus bilaterally as her only significant finding.  Neurological: Normal motor and sensory distally.    Imaging: X-rays show the knee replacements are well-fixed and in proper alignment on all 3 views both left and right.      No results found.     Lab Results   Component Value Date    WBC 5.4 05/09/2024    HGB 11.5 (L) 05/09/2024    .0 05/09/2024      Lab Results   Component Value Date    GLU 87 05/09/2024    BUN 12 05/09/2024    CREATSERUM 0.76 05/09/2024        Assessment and Plan:  Diagnoses and all orders for this visit:    Primary osteoarthritis of left knee    Status post total left knee replacement using cement    Primary osteoarthritis of right knee    Status post total right knee replacement using cement    Pes anserinus tendinitis of left lower extremity    Pes anserinus tendinitis of right lower extremity        Assessment: Above diagnoses.    Plan: We talked about weight loss, injections, and physical therapy for the pes anserinus bursitis but for now she feels she is improving and we will observe.  If they worsen she can revisit any of the above options.  She stated we answered all her questions to her satisfaction.  At this point I will see her as needed.    Follow Up: No follow-ups on file.    Thong Godoy MD

## 2024-09-19 ENCOUNTER — TELEMEDICINE (OUTPATIENT)
Dept: INTEGRATIVE MEDICINE | Facility: CLINIC | Age: 64
End: 2024-09-19
Payer: MEDICARE

## 2024-09-19 DIAGNOSIS — R63.5 WEIGHT GAIN: Primary | ICD-10-CM

## 2024-09-19 DIAGNOSIS — E03.8 SUBCLINICAL HYPOTHYROIDISM: ICD-10-CM

## 2024-09-19 DIAGNOSIS — L65.9 HAIR LOSS: ICD-10-CM

## 2024-09-19 DIAGNOSIS — L60.3 BRITTLE NAILS: ICD-10-CM

## 2024-09-19 DIAGNOSIS — K21.9 GASTROESOPHAGEAL REFLUX DISEASE, UNSPECIFIED WHETHER ESOPHAGITIS PRESENT: ICD-10-CM

## 2024-09-19 PROCEDURE — G2211 COMPLEX E/M VISIT ADD ON: HCPCS | Performed by: PHYSICIAN ASSISTANT

## 2024-09-19 PROCEDURE — 99215 OFFICE O/P EST HI 40 MIN: CPT | Performed by: PHYSICIAN ASSISTANT

## 2024-09-19 NOTE — PROGRESS NOTES
Daxa Leyva is a 64 year old female.  Chief Complaint   Patient presents with    Follow - Up     Lab results         HPI:   Daxa presents for follow up on weight loss, acid reflux     She has been off omeprazole for about 6weeks. She is not having any stomach pain. She is using the digestive enzyme, gastroment, orthospore     Biotin complex hair and skin - She still has brittle nails. Hair is falling out quite a bit. It grows back. She used to have thick hair     Updates from last visit:   She fell a few days after our last visit. She landed on her knee and her head she broke her glasses, teeth went through the lip and she chipped a tooth, she broke a tooth. She had swelling from the knee to the ankle. She had a lot of swelling behind the leg that looked like a baseball. She has not been using the vibration plate a lot due to the injury.     Thyroid:   T3 is low       Mental State:   Brain fog is a little worse.      GI - no bloating regular bowel movement     Weight -   Weight is about the same      Lifestyle Factors affecting health:   Diet - Diet she had to do more soft food due to the lip and nose fracture.     Exercise - She continued to do her normal exercise. She had to change movement due to       Sleep - Sleep has gotten a little better. She is going for stretching which is helping the leg and the back.       Supplements:   Gi support  orthospore, digestive enzyme and protective support  Added thyroid and metabolic detox       HPI's FROM PREVIOUS VISITS      Daxa presents for initial evaluation,     Main concern:   She is looking a different way of managing her health.     Thyroid:   She has not had a concern      Body/rash:   She has had back pain. She has been struggling for 6 months. Shesat a lot throughout her life. She has tried different chairs. She tries to doing stretches and sound healing. Started with low back ache. She has had pain and numbness above. She has pain that has settled in both of  the heels. The numbness comes and goes.     She has the sensation is still present but the heel does not feel correct  She can ambulate no issues     She has horrible leg cramps. They are thigh and lower leg lasting 3 minutes - 5 minutes. This will last a year.     She has leg swelling. She has done compression socks. She has not had improvement     Hormones - She did not have hot flashes.She had heavy painful regular periods   NO infertility issues, no miscarriage  Some brain fog   She would like to improve brain health.     GI -   Regular. It has been proportionate to what she is eating and drinking. No bloating  She has gerd. She has been on PPI for a few years. No endoscopy     Mental state -   She feels good emotionally. She has never had huge fluctuations     Weight History:   She was never overweight as a kid. She was a little heavier as a child. When she went through menopause it was a small weight gain. She has a harder time getting rid of it.      She did weight watchers. She struggles not buying food just for her.    She always had larger legs. She started having accumulation around her legs. Late 30s early 40s     She has lost 5-10 pounds she has noticed decreased weight in feet.       Pertinent Family history:   She has a grandmother who was heavy and had large legs.        Lifestyle Factors affecting health:   Diet -   Breakfast - eggs, Sometimes she will do frozen foods but she tries not to. Some cheese   Lunch sometimes she skips sometimes she doesn't  Sometimes leftovers, sometimes salad, when she goes out she will do vegetable soup. She tries to be sensible knowing dinner might not be  Dinner meat and vegetable sometimes a side, rice or a noodle something small     Snack skinny pop is here favorite, sometimes honey wheat pretzels       Exercise - she does not do well with exercise. She sits a lot during the day. She tries to get out during the day.   She will try to walk 1.5 miles on the days she  can      Stress - Stress has gotten a lot better. She had a boss that was toxic he is not longer in the work life     Sleep -   Sleep is affected because of back pain.     Supplements:   [employer]ush brand brain health    Viactive    Centrum   Rolo brain health     ALLERGIES     Allergies   Allergen Reactions    Dermabond ANAPHYLAXIS     Dermabond    Tetanus Toxoids OTHER (SEE COMMENTS) and UNKNOWN     -    Pt states that she does not recall.        CURRENT MEDICATIONS:     Current Outpatient Medications   Medication Sig Dispense Refill    lisinopril 40 MG Oral Tab Take 1 tablet (40 mg total) by mouth daily. 90 tablet 3    atorvastatin 10 MG Oral Tab Take 1 tablet (10 mg total) by mouth daily. 90 tablet 3    diclofenac 75 MG Oral Tab EC Take 1 tablet (75 mg total) by mouth 2 (two) times daily. 180 tablet 0    RABEprazole Sodium 20 MG Oral Tab EC Take 1 tablet (20 mg total) by mouth daily. 90 tablet 3    Multiple Vitamins-Minerals (CENTRUM ADULT 50+ MULTIGUMMIES OR) Take by mouth.         MEDICAL HISTORY:     Past Medical History:    Essential hypertension       SURGICAL HISTORY:     Past Surgical History:   Procedure Laterality Date    Total knee replacement Bilateral        FAMILY HISTORY:      Family History   Problem Relation Age of Onset    Cancer Father     COPD Mother        SOCIAL HISTORY:     Social History     Socioeconomic History    Marital status:    Tobacco Use    Smoking status: Never    Smokeless tobacco: Never   Vaping Use    Vaping status: Never Used   Substance and Sexual Activity    Alcohol use: Never    Drug use: Never       REVIEW OF SYSTEMS:   Review of Systems     See HPI for pertinent positives and negatives     PHYSICAL EXAM:   There were no vitals filed for this visit.    Physical Exam         Physical Exam  Constitutional:       Appearance: Normal appearance.   Neurological:      General: No focal deficit present.      Mental Status: She is alert and oriented to person, place, and time.    Psychiatric:         Mood and Affect: Mood normal.    ASSESSMENT AND PLAN:     Thyroid, weight and leptin resistance - metabolic detox complete and thyroid formula  And anti inflammatory diet     Thyroid labs in 3 months           1. Weight gain    2. Subclinical hypothyroidism  - Free T3 (Triiodothryronine); Future  - Reverse T3, Serum; Future  - Free T4, (Free Thyroxine); Future  - Assay, Thyroid Stim Hormone; Future    3. Brittle nails    4. Hair loss  - Free T3 (Triiodothryronine); Future  - Reverse T3, Serum; Future  - Free T4, (Free Thyroxine); Future  - Assay, Thyroid Stim Hormone; Future    5. Gastroesophageal reflux disease, unspecified whether esophagitis present      Time spent with patient: Over 40 minutes spent in chart review and in direct communication with patient obtaining and reviewing history, creating a unique care plan, explaining the rationale for treatment, reviewing potential SE and overall treatment plan,  documenting all clinical information in Epic. Over 50% of this time was in education, counseling and coordination of care.     Problem List Items Addressed This Visit    None  Visit Diagnoses       Weight gain    -  Primary    Subclinical hypothyroidism        Relevant Orders    Free T3 (Triiodothryronine)    Reverse T3, Serum    Free T4, (Free Thyroxine)    Assay, Thyroid Stim Hormone    Brittle nails        Hair loss        Relevant Orders    Free T3 (Triiodothryronine)    Reverse T3, Serum    Free T4, (Free Thyroxine)    Assay, Thyroid Stim Hormone    Gastroesophageal reflux disease, unspecified whether esophagitis present                 Orders Placed This Visit:  Orders Placed This Encounter   Procedures    Free T3 (Triiodothryronine)    Reverse T3, Serum    Free T4, (Free Thyroxine)    Assay, Thyroid Stim Hormone     No orders of the defined types were placed in this encounter.      Patient Instructions   Thyroid Formula  Nicolette PRO    2 daily     Biotin Complex Hair & Skin (60  capsules) (Pure Encapsulations)    To work with leptin resistance - increasing exercise, anti inflammatory diet     I want you to start resistance training a few times a week.       Comes in chocolate  1- shakes a day with anti inflammatory diet for 7-14 days     Pull back on centrum when utilizing the shake       ANTI-INFLAMMATORY LIFESTYLE FOODS    General Diet Recommendations   Eat protein at every meal emphasizing salmon, mackerel, sardines, and herring  Limit red meat consumption to two meals per week  Avoid omega 6 oils that aggravate inflammation   corn oil  soybean oil  safflower oil   sun flower oil  Avoid hydrogenated vegetable oils and fried foods  Avoid white flour and all refined carbohydrates including cereals, pasta, bread, bagels and English muffins. Replace these with brown rice, sweet potato, or steel-cut oats  Eat spices such as teirra, garlic, turmeric, cilantro, rosemary, oregano, and thyme.   Drink plenty of water (enough until urine is clear/faint yellow color and odorless)  Eat 5 servings of fresh vegetables daily   Eat no more than 3 servings of fresh fruit daily    Avoid sugar      Include in abundance   Fish: [place], Mackerel, Sardines, or Herring  Dark Green Leafy Vegetables  Yellow, Orange, and Red Vegetables  Bok miguel angel  Celery  Beets  Broccoli  Blueberries  Pineapple  Walnuts  Coconut Oil  Josiah Seeds  Flax Seeds  Turmeric  Tierra  Olive oil   Avoid   Sugar  Artificial Sweeteners  Saturated and Trans fats   Hydrogenated Oils  Canola  Corn Oil  Peanut  Soy  Omega 6 Fatty Acids  Refined Carbohydrates  Processed foods  MSG  Gluten- Breads, Crackers, Pastries, Cereals  Dairy/Casein  Alcohol  Deli Meats       RECIPE IDEAS  Breakfast   ENERGIZING PINEAPPLE SMOOTHIE  Ingredients  1 cup brewed and cooled green tea  2 cups spinach or kale  1 cup frozen pineapple chunks  ? cup cucumber, peeled and cut into large chunks)  ½ cup frozen natalya chunks  ½ of a medium banana, peeled  ½” fresh tierra -  peeled and cut from stalk (about ½ tsp)  ¼ tsp ground turmeric  3 mint leaves - rough chopped  1 scoop protein powder  1 Tbsp shilpi seeds  4-5 ice cubes (or more or less to personal desired consistency)    Directions  1.  Combine all the ingredients, except the shilpi seeds, in a high speed .    2.  Add shilpi seeds at the end of the blending process so they don’t stick to the  container.    3. If you like your smoothie thicker, add ice cubes and blend until desired consistency is met. AVOCADO TOAST WITH EGG  Ingredients  1 slice of gluten-free bread, toasted  1½ tsp ghee  ½ an avocado  Handful of spinach  1 egg, poached or scrambled  Red pepper flakes  This combination is simple to create.    Directions  1.  Toast the gluten-free bread and top with ghee.    2.  Spread the avocado onto the toast. Place fresh spinach leaves on top of the avocado and then top all with a poached or scrambled egg and finish it off with a sprinkle of red pepper flakes.    3. Enjoy open faced with a fork and knife, or add a second piece of toast to make it a sandwich.         SHILPI QUINOA PORRIDGE  Ingredients  1 cup thick cashew milk  2 cups cooked quinoa  1 cup fresh organic blueberries (or frozen)  ¼ cup toasted walnuts  ½ tsp ground cinnamon  2 tsp raw honey  1 Tbsp shilpi seeds    Directions  1.  Combine the quinoa and cashew milk in a saucepan and slowly warm over medium low heat.    2.  Stir in blueberries, cinnamon and walnuts until all are evenly warmed. Remove from heat and stir in raw honey. Top with shilpi seeds.    3.  Serve in bowls and top with raw cacao nibs for an added pop of antioxidants.                 http://www.[email or web address].com/health/centers/rheumatoid_arthritis/articles/famous_chefs_recipes_for_your_antiinflammatory_diet.aspx                    Lunch   LENTIL VEGETABLE SOUP  Ingredients   1 pound Brazilian green lentils, dry   4 cups chopped yellow onions (3 large onions)   4 cups chopped leeks, white part only (2  leeks)   1 tablespoon minced garlic (3 cloves)   1/4 cup good olive oil, plus additional for drizzling on top   1 tablespoon kosher salt   1-½ teaspoons freshly ground black pepper   1 tablespoon minced fresh thyme leaves or 1 teaspoon dried   1 teaspoon ground cumin   3 cups medium-diced celery (8 stalks)   3 cups medium-diced carrots (4-6 carrots)   3 quarts chicken stock   1/4 cup tomato paste   2 tablespoons red wine or red wine vinegar   Freshly grated Parmesan cheese     Directions  1. In a large bowl, cover the lentils with boiling water and let sit for 15 minutes. Drain.     2. In a large stockpot over medium heat, sauté the onions, leeks, and garlic with the olive oil, salt, pepper, thyme and cumin for 20 minutes, until the vegetables are translucent and very tender.    3. Add the celery and carrots and sauté for 10 minutes. Add the chicken stock, tomato paste and lentils. Cover and bring to a boil. Reduce heat and simmer uncovered for 1 hour, until the lentils are cooked through.     4. Check the seasonings. Add the red wine and serve hot, drizzled with olive oil and sprinkled with grated Parmesan.  GRILLED EGGPLANT SALAD SERVES  Ingredients   1 Italian eggplant, cut into 1-inch-thick slices   1 large red onion, cut into rounds Canola oil   1 avocado, halved, pitted, peeled and chopped   1 tablespoon red wine vinegar   1 teaspoon Kevin mustard   1 tablespoon coarsely chopped oregano leaves   Honey   Olive oil  Salt   Freshly ground black pepper   1 lemon, zested     Directions  1. Brush the eggplant and red onions with canola oil and arrange on the grill. Cook the eggplant until soft and grill the onions until they have a slight jennifer.     2. Remove from the grill to a cutting board and let cool slightly. Once cool, roughly chop and add them to a serving bowl along with the avocado.     3. In a small bowl, whisk together the red wine vinegar, Kevin and oregano.     4. Add honey and olive oil, to taste, and  blend until emulsified. Season with salt and pepper, to taste. 5. Add the dressing to the eggplant mixture and toss.                                 WILD RICE PILAF  Ingredients  Brittany Wild Rice  Olive oil  Vegetable stock  1 cup sliced mushrooms  1 minced shallot  ½ cup chopped celery    Directions  1. Follow the cooking instructions on a package of “Brittany Wild Rice Blend” using olive oil instead of butter and vegetable stock instead of water.     2. In a non-stick pan saute 1 cup of sliced mushrooms, 1 minced shallot, ½ cup chopped celery, and 1 clove of minced garlic in 3 tbsp olive oil.     3. When the rice is done, combine with the mushroom mixture and serve.      http://www.[employer]/health/centers/rheumatoid_arthritis/articles/famous_chefs_recipes_for_your_antiinflammatory_diet.aspx  Dinner   ROASTED CHICKEN WITH BALSAMIC VINAIGRETTE  Ingredients   1/4 cup balsamic vinegar   2 tablespoons Kevin mustard   2 tablespoons fresh lemon juice   2 garlic cloves, chopped   2 tablespoons olive oil   Salt   Freshly ground black pepper   1 (4-pound) whole chicken, cut into pieces (reserve giblets, neck and backbone for another use)   1/2 cup low-salt chicken broth   1 teaspoon lemon zest   1 tablespoon chopped fresh parsley leaves     Directions  1. Whisk the vinegar, mustard, lemon juice, garlic, olive oil, salt and pepper in small bowl to blend.     2. Combine the vinaigrette and chicken pieces in a large, resealable plastic bag; seal the bag and toss to coat. Refrigerate, turning the chicken pieces occasionally, for at least 2 hours and up to one day.     3. Preheat the oven to 400 degrees F. Remove chicken from the bag and arrange pieces on a large, greased baking dish.     4. Roast until the chicken is just cooked through, about 1 hour. If your chicken browns too quickly, cover it with foil for the remaining cooking time.    5. Transfer the chicken to a serving platter.    HERBED SALMON  Ingredients  1/2  seedless cucumber   2 small plum tomatoes   1 shallot or 1/4 red onion, finely chopped, divided   2 tablespoons Kevin mustard   2 tablespoons sugar   1/4 cup white wine vinegar   1/2 cup extra-virgin olive oil, plus a drizzle   1/4 cup finely chopped fresh dill   Salt  Freshly ground black pepper 4 (6-ounce) skinless salmon fillets   Seafood seasoning (recommended: [facility])     Directions  1. Dice cucumber into 1/4-inch pieces - to peel or not to peel is up to you. Seed and dice the tomatoes into 1/4-inch pieces. Combine cucumber, tomatoes and half the shallots or red onion in a bowl and set aside.     2. In a small bowl, whisk together mustard, sugar and white wine vinegar and remaining half of the shallots. Stream in extra-virgin olive oil while continuing to whisk. Stir in dill and season dressing with salt and pepper to taste.     3. Season the salmon with seafood seasoning and a little black pepper.     4. Heat a nonstick skillet with a drizzle of extra-virgin olive oil over medium-high heat. Place salmon rounded side down and cook until sterling and a little crispy at the edges, 3-4 minutes. Flip and cook 2 minutes more for a pink center, or 4 minutes for opaque fish.     5. Transfer salmon to dinner or serving plates, top with the cucumber-tomato relish and cover with a liberal amount of dill dressing.  BLACK FRIED RICE W/ SNAP PEAS  Ingredients  2 tablespoons Organic Extra Virgin Coconut Oil  2 medium carrots, diced  1 small yellow onion, diced  1 bunch scallions, white and green parts , thinly sliced  1 cup thinly sliced snap peas  2 garlic cloves, minced  1 tablespoon minced fresh tierra  3 cups cooked black rice (from 1 cup uncooked)  3 tablespoons Liquid Aminos  2 teaspoons toasted sesame oil  1 teaspoon sriracha  2 eggs, beaten  1 tablespoon Organic Shelled Hemp Seed    Directions  1. In a large wok or nonstick skillet heat the coconut oil. Sauté the carrot, onion, and white scallion over high  heat until soft and beginning to brown, about 5 minutes.     2.  Add the snap peas, garlic, tierra, and green scallions and stir-becerra until fragrant, another 2 minutes. Fold in the rice and stir-becerra until well-coated in the vegetable mixture and beginning to toast, 2 minutes.     3.  Add the liquid aminos, sesame oil, and sriracha and stir to combine. Push the rice to the side of the pan to create a well. Pour the eggs into the center and cook, stirring gently, until nearly set.     4.  Toss the fried rice with the eggs and hemp seeds. Transfer the fried rice to bowls and serve right away.            http://[employer]/fried-forbiddenblack-rice-recipe-with-snap-peas-and-scallions/   http://www.[employer]/health/centers/rheumatoid_arthritis/articles/famous_chefs_recipes_for_your_antiinflammatory_diet.aspx    Baked Goods/Snacks   BANANA NUT MUFFINS  Ingredients  1 cup almond flour  1 cup gluten-free sprouted flour blend (brown rice, oat and sorghum blend)  1 teaspoon cinnamon  ½ teaspoon salt  2 teaspoons baking soda  2 eggs or 2 flax eggs (flax eggs: 1/4 cup water + 2 tablespoons ground flax)  ¼ cup water  ½ cup maple syrup  2 very ripe bananas, mashed  ½ cup walnuts, crushed  TOPPINGS:  1 banana, cut in half and thinly sliced  Crushed walnuts     Directions   1.  Preheat oven to 350 F.    2.  Mix all ingredients in a large bowl.  3.  Fill greased or paper-lined muffin cups 2/3 full.  4. Add banana slice and walnuts and bake for 15-18 minutes.   NO BAKE ALMOND BARS  Ingredients  3/4 cup almond flour  3/4 cup unsweetened finely shredded coconut  3/4 cup equivalent powdered sweetener (Use powdered coconut sugar for Paleo or powdered sweetener)  1 cup + 2 Tbsp almond butter (or any nut butter)  2 Tbsp coconut oil  4 1/2 oz dark chocolate?    Directions  1.  In a large bowl, combine the almond flour, coconut, and sweetener.     2. Over medium-low heat melt 1 cup of almond butter and coconut oil.    3. Once  melted, add the almond butter to the dry ingredients and mix well.    4. Press the mixture into a 8\" x 8\" baking dish.    5. Over medium-low heat melt 2 Tbsp of almond butter and the chocolate.    6. Once melted, pour the chocolate over the almond butter mixture and smooth out the top for even coverage.    7. Refrigerate for 2 hours or until the almond butter mixture has set. To reduce the amount of time for the almond butter mixture to set place the bars in the freezer until set then cut into 12 even bars.   BAKED APPLES  Ingredients  4 medium apples  1 lemon (juice and rind)  1 cinnamon stick  Currants or raisins     Directions  1.  Place 4 medium apples that have been washed and cored in a large glass oven proof bowl (with lid).     2.  Place a strip of lemon rind, ¼ of a whole vanilla bean, 1 cinnamon stick, and some currants or raisins inside each apple. Drizzle apples with ¼ cup lemon juice and scatter remainder of currants around them.     3.  Bake covered at 350 degrees for 60 to 75 minutes. Serve warm or cold, you can also drizzle with almond milk.       https://[employer]xe.com/healthy-snacks/          No follow-ups on file.    Patient affirmed understanding of plan and all questions were answered.     Julia Roberto PA-C

## 2024-09-19 NOTE — PATIENT INSTRUCTIONS
Blood work 2 weeks before next appointment     Thyroid Formula  Nicolette PRO    2 daily     Biotin Complex Hair & Skin (60 capsules) (Pure Encapsulations)    To work with leptin resistance - increasing exercise, anti inflammatory diet     I want you to start resistance training a few times a week.       Comes in chocolate  1- shakes a day with anti inflammatory diet for 7-14 days     Pull back on centrum when utilizing the shake       ANTI-INFLAMMATORY LIFESTYLE FOODS    General Diet Recommendations   Eat protein at every meal emphasizing salmon, mackerel, sardines, and herring  Limit red meat consumption to two meals per week  Avoid omega 6 oils that aggravate inflammation   corn oil  soybean oil  safflower oil   sun flower oil  Avoid hydrogenated vegetable oils and fried foods  Avoid white flour and all refined carbohydrates including cereals, pasta, bread, bagels and English muffins. Replace these with brown rice, sweet potato, or steel-cut oats  Eat spices such as tierra, garlic, turmeric, cilantro, rosemary, oregano, and thyme.   Drink plenty of water (enough until urine is clear/faint yellow color and odorless)  Eat 5 servings of fresh vegetables daily   Eat no more than 3 servings of fresh fruit daily    Avoid sugar      Include in abundance   Fish: Warners, Mackerel, Sardines, or Herring  Dark Green Leafy Vegetables  Yellow, Orange, and Red Vegetables  Bok miguel angel  Celery  Beets  Broccoli  Blueberries  Pineapple  Walnuts  Coconut Oil  Josiah Seeds  Flax Seeds  Turmeric  Tierra  Olive oil   Avoid   Sugar  Artificial Sweeteners  Saturated and Trans fats   Hydrogenated Oils  Canola  Corn Oil  Peanut  Soy  Omega 6 Fatty Acids  Refined Carbohydrates  Processed foods  MSG  Gluten- Breads, Crackers, Pastries, Cereals  Dairy/Casein  Alcohol  Deli Meats       RECIPE IDEAS  Breakfast   ENERGIZING PINEAPPLE SMOOTHIE  Ingredients  1 cup brewed and cooled green tea  2 cups spinach or kale  1 cup frozen pineapple chunks  ? cup  cucumber, peeled and cut into large chunks)  ½ cup frozen natalya chunks  ½ of a medium banana, peeled  ½” fresh tierra - peeled and cut from stalk (about ½ tsp)  ¼ tsp ground turmeric  3 mint leaves - rough chopped  1 scoop protein powder  1 Tbsp shilpi seeds  4-5 ice cubes (or more or less to personal desired consistency)    Directions  1.  Combine all the ingredients, except the shilpi seeds, in a high speed .    2.  Add shilpi seeds at the end of the blending process so they don’t stick to the  container.    3. If you like your smoothie thicker, add ice cubes and blend until desired consistency is met. AVOCADO TOAST WITH EGG  Ingredients  1 slice of gluten-free bread, toasted  1½ tsp ghee  ½ an avocado  Handful of spinach  1 egg, poached or scrambled  Red pepper flakes  This combination is simple to create.    Directions  1.  Toast the gluten-free bread and top with ghee.    2.  Spread the avocado onto the toast. Place fresh spinach leaves on top of the avocado and then top all with a poached or scrambled egg and finish it off with a sprinkle of red pepper flakes.    3. Enjoy open faced with a fork and knife, or add a second piece of toast to make it a sandwich.         SHILPI QUINOA PORRIDGE  Ingredients  1 cup thick cashew milk  2 cups cooked quinoa  1 cup fresh organic blueberries (or frozen)  ¼ cup toasted walnuts  ½ tsp ground cinnamon  2 tsp raw honey  1 Tbsp shilpi seeds    Directions  1.  Combine the quinoa and cashew milk in a saucepan and slowly warm over medium low heat.    2.  Stir in blueberries, cinnamon and walnuts until all are evenly warmed. Remove from heat and stir in raw honey. Top with shilpi seeds.    3.  Serve in bowls and top with raw cacao nibs for an added pop of antioxidants.                 http://www.[employer].com/health/centers/rheumatoid_arthritis/articles/famous_chefs_recipes_for_your_antiinflammatory_diet.aspx                    Lunch   LENTIL VEGETABLE SOUP  Ingredients   1  pound Taiwanese green lentils, dry   4 cups chopped yellow onions (3 large onions)   4 cups chopped leeks, white part only (2 leeks)   1 tablespoon minced garlic (3 cloves)   1/4 cup good olive oil, plus additional for drizzling on top   1 tablespoon kosher salt   1-½ teaspoons freshly ground black pepper   1 tablespoon minced fresh thyme leaves or 1 teaspoon dried   1 teaspoon ground cumin   3 cups medium-diced celery (8 stalks)   3 cups medium-diced carrots (4-6 carrots)   3 quarts chicken stock   1/4 cup tomato paste   2 tablespoons red wine or red wine vinegar   Freshly grated Parmesan cheese     Directions  1. In a large bowl, cover the lentils with boiling water and let sit for 15 minutes. Drain.     2. In a large stockpot over medium heat, sauté the onions, leeks, and garlic with the olive oil, salt, pepper, thyme and cumin for 20 minutes, until the vegetables are translucent and very tender.    3. Add the celery and carrots and sauté for 10 minutes. Add the chicken stock, tomato paste and lentils. Cover and bring to a boil. Reduce heat and simmer uncovered for 1 hour, until the lentils are cooked through.     4. Check the seasonings. Add the red wine and serve hot, drizzled with olive oil and sprinkled with grated Parmesan.  GRILLED EGGPLANT SALAD SERVES  Ingredients   1 Italian eggplant, cut into 1-inch-thick slices   1 large red onion, cut into rounds Canola oil   1 avocado, halved, pitted, peeled and chopped   1 tablespoon red wine vinegar   1 teaspoon Kevin mustard   1 tablespoon coarsely chopped oregano leaves   Honey   Olive oil  Salt   Freshly ground black pepper   1 lemon, zested     Directions  1. Brush the eggplant and red onions with canola oil and arrange on the grill. Cook the eggplant until soft and grill the onions until they have a slight jennifer.     2. Remove from the grill to a cutting board and let cool slightly. Once cool, roughly chop and add them to a serving bowl along with the avocado.      3. In a small bowl, whisk together the red wine vinegar, Kevin and oregano.     4. Add honey and olive oil, to taste, and blend until emulsified. Season with salt and pepper, to taste. 5. Add the dressing to the eggplant mixture and toss.                                 WILD RICE PILAF  Ingredients  Brittany Wild Rice  Olive oil  Vegetable stock  1 cup sliced mushrooms  1 minced shallot  ½ cup chopped celery    Directions  1. Follow the cooking instructions on a package of “Brittany Wild Rice Blend” using olive oil instead of butter and vegetable stock instead of water.     2. In a non-stick pan saute 1 cup of sliced mushrooms, 1 minced shallot, ½ cup chopped celery, and 1 clove of minced garlic in 3 tbsp olive oil.     3. When the rice is done, combine with the mushroom mixture and serve.      http://www."[employer]/health/centers/rheumatoid_arthritis/articles/famous_chefs_recipes_for_your_antiinflammatory_diet.aspx  Dinner   ROASTED CHICKEN WITH BALSAMIC VINAIGRETTE  Ingredients   1/4 cup balsamic vinegar   2 tablespoons Kevin mustard   2 tablespoons fresh lemon juice   2 garlic cloves, chopped   2 tablespoons olive oil   Salt   Freshly ground black pepper   1 (4-pound) whole chicken, cut into pieces (reserve giblets, neck and backbone for another use)   1/2 cup low-salt chicken broth   1 teaspoon lemon zest   1 tablespoon chopped fresh parsley leaves     Directions  1. Whisk the vinegar, mustard, lemon juice, garlic, olive oil, salt and pepper in small bowl to blend.     2. Combine the vinaigrette and chicken pieces in a large, resealable plastic bag; seal the bag and toss to coat. Refrigerate, turning the chicken pieces occasionally, for at least 2 hours and up to one day.     3. Preheat the oven to 400 degrees F. Remove chicken from the bag and arrange pieces on a large, greased baking dish.     4. Roast until the chicken is just cooked through, about 1 hour. If your chicken browns too quickly, cover it  with foil for the remaining cooking time.    5. Transfer the chicken to a serving platter.    HERBED SALMON  Ingredients  1/2 seedless cucumber   2 small plum tomatoes   1 shallot or 1/4 red onion, finely chopped, divided   2 tablespoons Kevin mustard   2 tablespoons sugar   1/4 cup white wine vinegar   1/2 cup extra-virgin olive oil, plus a drizzle   1/4 cup finely chopped fresh dill   Salt  Freshly ground black pepper 4 (6-ounce) skinless salmon fillets   Seafood seasoning (recommended: [facility])     Directions  1. Dice cucumber into 1/4-inch pieces - to peel or not to peel is up to you. Seed and dice the tomatoes into 1/4-inch pieces. Combine cucumber, tomatoes and half the shallots or red onion in a bowl and set aside.     2. In a small bowl, whisk together mustard, sugar and white wine vinegar and remaining half of the shallots. Stream in extra-virgin olive oil while continuing to whisk. Stir in dill and season dressing with salt and pepper to taste.     3. Season the salmon with seafood seasoning and a little black pepper.     4. Heat a nonstick skillet with a drizzle of extra-virgin olive oil over medium-high heat. Place salmon rounded side down and cook until sterling and a little crispy at the edges, 3-4 minutes. Flip and cook 2 minutes more for a pink center, or 4 minutes for opaque fish.     5. Transfer salmon to dinner or serving plates, top with the cucumber-tomato relish and cover with a liberal amount of dill dressing.  BLACK FRIED RICE W/ SNAP PEAS  Ingredients  2 tablespoons Organic Extra Virgin Coconut Oil  2 medium carrots, diced  1 small yellow onion, diced  1 bunch scallions, white and green parts , thinly sliced  1 cup thinly sliced snap peas  2 garlic cloves, minced  1 tablespoon minced fresh tierra  3 cups cooked black rice (from 1 cup uncooked)  3 tablespoons Liquid Aminos  2 teaspoons toasted sesame oil  1 teaspoon sriracha  2 eggs, beaten  1 tablespoon Organic Shelled Hemp  Seed    Directions  1. In a large wok or nonstick skillet heat the coconut oil. Sauté the carrot, onion, and white scallion over high heat until soft and beginning to brown, about 5 minutes.     2.  Add the snap peas, garlic, tierra, and green scallions and stir-becerra until fragrant, another 2 minutes. Fold in the rice and stir-becerra until well-coated in the vegetable mixture and beginning to toast, 2 minutes.     3.  Add the liquid aminos, sesame oil, and sriracha and stir to combine. Push the rice to the side of the pan to create a well. Pour the eggs into the center and cook, stirring gently, until nearly set.     4.  Toss the fried rice with the eggs and hemp seeds. Transfer the fried rice to bowls and serve right away.            http://[employer]/fried-forbiddenblack-rice-recipe-with-snap-peas-and-scallions/   http://www.[employer]/health/centers/rheumatoid_arthritis/articles/famous_chefs_recipes_for_your_antiinflammatory_diet.aspx    Baked Goods/Snacks   BANANA NUT MUFFINS  Ingredients  1 cup almond flour  1 cup gluten-free sprouted flour blend (brown rice, oat and sorghum blend)  1 teaspoon cinnamon  ½ teaspoon salt  2 teaspoons baking soda  2 eggs or 2 flax eggs (flax eggs: 1/4 cup water + 2 tablespoons ground flax)  ¼ cup water  ½ cup maple syrup  2 very ripe bananas, mashed  ½ cup walnuts, crushed  TOPPINGS:  1 banana, cut in half and thinly sliced  Crushed walnuts     Directions   1.  Preheat oven to 350 F.    2.  Mix all ingredients in a large bowl.  3.  Fill greased or paper-lined muffin cups 2/3 full.  4. Add banana slice and walnuts and bake for 15-18 minutes.   NO BAKE ALMOND BARS  Ingredients  3/4 cup almond flour  3/4 cup unsweetened finely shredded coconut  3/4 cup equivalent powdered sweetener (Use powdered coconut sugar for Paleo or powdered sweetener)  1 cup + 2 Tbsp almond butter (or any nut butter)  2 Tbsp coconut oil  4 1/2 oz dark chocolate?    Directions  1.  In a large bowl,  combine the almond flour, coconut, and sweetener.     2. Over medium-low heat melt 1 cup of almond butter and coconut oil.    3. Once melted, add the almond butter to the dry ingredients and mix well.    4. Press the mixture into a 8\" x 8\" baking dish.    5. Over medium-low heat melt 2 Tbsp of almond butter and the chocolate.    6. Once melted, pour the chocolate over the almond butter mixture and smooth out the top for even coverage.    7. Refrigerate for 2 hours or until the almond butter mixture has set. To reduce the amount of time for the almond butter mixture to set place the bars in the freezer until set then cut into 12 even bars.   BAKED APPLES  Ingredients  4 medium apples  1 lemon (juice and rind)  1 cinnamon stick  Currants or raisins     Directions  1.  Place 4 medium apples that have been washed and cored in a large glass oven proof bowl (with lid).     2.  Place a strip of lemon rind, ¼ of a whole vanilla bean, 1 cinnamon stick, and some currants or raisins inside each apple. Drizzle apples with ¼ cup lemon juice and scatter remainder of currants around them.     3.  Bake covered at 350 degrees for 60 to 75 minutes. Serve warm or cold, you can also drizzle with almond milk.       https://draxe.com/healthy-snacks/

## 2024-10-04 RX ORDER — DICLOFENAC SODIUM 75 MG/1
75 TABLET, DELAYED RELEASE ORAL 2 TIMES DAILY
Qty: 180 TABLET | Refills: 0 | Status: SHIPPED | OUTPATIENT
Start: 2024-10-04

## 2024-10-04 RX ORDER — DICLOFENAC SODIUM 75 MG/1
75 TABLET, DELAYED RELEASE ORAL 2 TIMES DAILY
Qty: 180 TABLET | Refills: 0 | OUTPATIENT
Start: 2024-10-04

## 2024-10-04 NOTE — TELEPHONE ENCOUNTER
Refill passed per [facility] protocol.  Requested Prescriptions   Pending Prescriptions Disp Refills    DICLOFENAC 75 MG Oral Tab EC [Pharmacy Med Name: Diclofenac Sodium Oral Tablet Delayed Release 75 MG 75 MG  Tablet Delayed Release] 180 tablet 0     Sig: TAKE ONE TABLET (75 MG TOTAL) BY MOUTH TWO (2) TIMES DAILY.       Non-Narcotic Pain Medication Protocol Passed - 10/3/2024 11:24 AM        Passed - In person appointment or virtual visit in the past 6 mos or appointment in next 3 mos     Recent Outpatient Visits              2 weeks ago [facility] [place]    Telemedicine    1 month ago Primary osteoarthritis of left knee    [facility] Thong Romero MD    Office Visit    2 months ago Closed fracture of nasal bone, initial encounter    [facility], Negro Damon MD    Office Visit    2 months ago Lumbar pain    [facility] [place] Nia Hooper Emily E, PA-C    Office Visit    5 months ago Primary hypertension    [facility] [place] Nia Hooper Arlinda, MD    Office Visit          Future Appointments         Provider Department Appt Notes    In 2 months Julia Roberto PA-C [facility] [place] [facility] [place] f/u                       Recent Outpatient Visits              2 weeks ago [facility] [place]    Telemedicine    1 month ago [facility] left knee    [facility]    Office Visit    2 months ago Closed fracture of nasal bone, initial encounter    [facility], Negro Damon MD    Office Visit    2 months ago Lumbar pain    [facility] [place]  Julia Daigle PA-C    Office Visit    5 months ago Primary hypertension    [facility], Nia James Arlinda, MD    Office Visit          Future Appointments         Provider Department Appt Notes    In 2 months Julia Roberto PA-C [facility], [place], [place] 3 month f/u

## 2024-12-05 ENCOUNTER — TELEMEDICINE (OUTPATIENT)
Dept: INTERNAL MEDICINE CLINIC | Facility: CLINIC | Age: 64
End: 2024-12-05

## 2024-12-05 DIAGNOSIS — Z12.31 ENCOUNTER FOR SCREENING MAMMOGRAM FOR MALIGNANT NEOPLASM OF BREAST: ICD-10-CM

## 2024-12-05 DIAGNOSIS — I10 PRIMARY HYPERTENSION: Primary | ICD-10-CM

## 2024-12-05 DIAGNOSIS — Z12.11 SCREENING FOR COLON CANCER: ICD-10-CM

## 2024-12-05 PROCEDURE — 99213 OFFICE O/P EST LOW 20 MIN: CPT | Performed by: INTERNAL MEDICINE

## 2024-12-05 NOTE — PROGRESS NOTES
This is a telemedicine visit with live, interactive video and audio.     Patient understands and accepts financial responsibility for any deductible, co-insurance and/or co-pays associated with this service.    SUBJECTIVE  She scheduled video visit today for follow-up.  She needs referral for mammogram.  She is also due for colonoscopy her last colonoscopy was 2014 she needs new referral    Her blood pressure is controlled she is not checking every day she checks few times a week.  She takes lisinopril 40 mg daily    HISTORY:  Past Medical History:    Essential hypertension      Past Surgical History:   Procedure Laterality Date    Total knee replacement Bilateral       Family History   Problem Relation Age of Onset    Cancer Father     COPD Mother       Social History     Socioeconomic History    Marital status:    Tobacco Use    Smoking status: Never    Smokeless tobacco: Never   Vaping Use    Vaping status: Never Used   Substance and Sexual Activity    Alcohol use: Never    Drug use: Never        Allergies[1]   Current Outpatient Medications   Medication Sig Dispense Refill    diclofenac 75 MG Oral Tab EC Take 1 tablet (75 mg total) by mouth 2 (two) times daily. 180 tablet 0    lisinopril 40 MG Oral Tab Take 1 tablet (40 mg total) by mouth daily. 90 tablet 3    atorvastatin 10 MG Oral Tab Take 1 tablet (10 mg total) by mouth daily. 90 tablet 3    RABEprazole Sodium 20 MG Oral Tab EC Take 1 tablet (20 mg total) by mouth daily. 90 tablet 3    Multiple Vitamins-Minerals (CENTRUM ADULT 50+ MULTIGUMMIES OR) Take by mouth.     Ros no complains    OBJECTIVE  Physical Exam:   Awake alert orineted    ASSESSMENT & PLAN  Diagnoses and all orders for this visit:    Encounter for screening mammogram for malignant neoplasm of breast  -     [facility] FLORESITA 2D+3D SCREENING BILAT (CPT=77067/41710); Future    Mammogram ordered     Screening for colon cancer- gi    Htn- controlled continue with current medications    Time 7  césar LINO MD             [1]   Allergies  Allergen Reactions    Dermabond ANAPHYLAXIS     Dermabond    Tetanus Toxoids OTHER (SEE COMMENTS) and UNKNOWN     -    Pt states that she does not recall.

## 2024-12-19 RX ORDER — DICLOFENAC SODIUM 75 MG/1
75 TABLET, DELAYED RELEASE ORAL 2 TIMES DAILY
Qty: 180 TABLET | Refills: 0 | Status: SHIPPED | OUTPATIENT
Start: 2024-12-19

## 2024-12-20 NOTE — TELEPHONE ENCOUNTER
Refill passed per [facility] protocol.    Requested Prescriptions   Pending Prescriptions Disp Refills    diclofenac 75 MG Oral Tab  tablet 0     Sig: Take 1 tablet (75 mg total) by mouth 2 (two) times daily.       Non-Narcotic Pain Medication Protocol Passed - 12/19/2024  8:22 PM        Passed - In person appointment or virtual visit in the past 6 mos or appointment in next 3 mos     Recent Outpatient Visits              2 weeks ago Primary hypertension    [facility], Nia James Arlinda, MD    Telemedicine    3 months ago Weight gain    [facility], Nia James Emily E, PA-C    Telemedicine    3 months ago [facility]    [facility]    4 months ago Closed fracture of nasal bone, initial encounter    [facility], Negro Damon MD    Office Visit    5 months ago Lumbar pain    [facility]    Office Visit          Future Appointments         Provider Department Appt Notes    In 1 week [address]                        Future Appointments         Provider Department Appt Notes    In 1 week [address]           Recent Outpatient Visits              2 weeks ago Primary hypertension    [facility], Nia James Arlinda, MD    Telemedicine    3 months ago Weight gain    [facility], Nia James Emily E, PA-C    Telemedicine    3 months ago Primary osteoarthritis of left knee    [facility]    [facility] Visit    4 months ago Closed fracture of nasal bone, initial encounter     [facility], [facility], Negro Damon MD    Office Visit    5 months ago Lumbar pain    [facility]    Office Visit

## 2024-12-28 ENCOUNTER — HOSPITAL ENCOUNTER (OUTPATIENT)
Dept: MAMMOGRAPHY | Facility: HOSPITAL | Age: 64
Discharge: HOME | End: 2024-12-28
Attending: INTERNAL MEDICINE
Payer: MEDICARE

## 2024-12-28 DIAGNOSIS — Z12.31 ENCOUNTER FOR SCREENING MAMMOGRAM FOR MALIGNANT NEOPLASM OF BREAST: ICD-10-CM

## 2024-12-28 PROCEDURE — 77067 SCR MAMMO BI INCL CAD: CPT | Performed by: INTERNAL MEDICINE

## 2024-12-28 PROCEDURE — 77063 BREAST TOMOSYNTHESIS BI: CPT | Performed by: INTERNAL MEDICINE

## 2025-03-05 RX ORDER — DICLOFENAC SODIUM 75 MG/1
75 TABLET, DELAYED RELEASE ORAL 2 TIMES DAILY
Qty: 180 TABLET | Refills: 0 | Status: SHIPPED | OUTPATIENT
Start: 2025-03-05

## 2025-07-01 RX ORDER — RABEPRAZOLE SODIUM 20 MG/1
20 TABLET, DELAYED RELEASE ORAL DAILY
Qty: 90 TABLET | Refills: 3 | Status: SHIPPED | OUTPATIENT
Start: 2025-07-01

## 2025-07-02 RX ORDER — DICLOFENAC SODIUM 75 MG/1
75 TABLET, DELAYED RELEASE ORAL 2 TIMES DAILY
Qty: 180 TABLET | Refills: 0 | Status: SHIPPED | OUTPATIENT
Start: 2025-07-02

## 2025-07-02 RX ORDER — ATORVASTATIN CALCIUM 10 MG/1
10 TABLET, FILM COATED ORAL DAILY
Qty: 90 TABLET | Refills: 3 | Status: SHIPPED | OUTPATIENT
Start: 2025-07-02

## 2025-07-02 RX ORDER — LISINOPRIL 40 MG/1
40 TABLET ORAL DAILY
Qty: 90 TABLET | Refills: 3 | Status: SHIPPED | OUTPATIENT
Start: 2025-07-02